# Patient Record
Sex: FEMALE | Race: WHITE
[De-identification: names, ages, dates, MRNs, and addresses within clinical notes are randomized per-mention and may not be internally consistent; named-entity substitution may affect disease eponyms.]

---

## 2019-08-31 ENCOUNTER — HOSPITAL ENCOUNTER (INPATIENT)
Dept: HOSPITAL 47 - FBPOP | Age: 24
LOS: 3 days | Discharge: HOME | End: 2019-09-03
Attending: OBSTETRICS & GYNECOLOGY | Admitting: OBSTETRICS & GYNECOLOGY
Payer: COMMERCIAL

## 2019-08-31 VITALS — BODY MASS INDEX: 51.3 KG/M2

## 2019-08-31 DIAGNOSIS — Z79.899: ICD-10-CM

## 2019-08-31 DIAGNOSIS — Z3A.39: ICD-10-CM

## 2019-08-31 DIAGNOSIS — Z88.6: ICD-10-CM

## 2019-08-31 DIAGNOSIS — Z83.49: ICD-10-CM

## 2019-08-31 DIAGNOSIS — E66.01: ICD-10-CM

## 2019-08-31 DIAGNOSIS — Z83.3: ICD-10-CM

## 2019-08-31 DIAGNOSIS — Z90.49: ICD-10-CM

## 2019-08-31 DIAGNOSIS — G43.909: ICD-10-CM

## 2019-08-31 DIAGNOSIS — Z82.49: ICD-10-CM

## 2019-08-31 LAB
BASOPHILS # BLD AUTO: 0 K/UL (ref 0–0.2)
BASOPHILS NFR BLD AUTO: 0 %
EOSINOPHIL # BLD AUTO: 0.1 K/UL (ref 0–0.7)
EOSINOPHIL NFR BLD AUTO: 1 %
ERYTHROCYTE [DISTWIDTH] IN BLOOD BY AUTOMATED COUNT: 4.17 M/UL (ref 3.8–5.4)
ERYTHROCYTE [DISTWIDTH] IN BLOOD: 15.6 % (ref 11.5–15.5)
GLUCOSE BLD-MCNC: 91 MG/DL (ref 75–99)
HCT VFR BLD AUTO: 36.4 % (ref 34–46)
HGB BLD-MCNC: 12.5 GM/DL (ref 11.4–16)
LYMPHOCYTES # SPEC AUTO: 1.8 K/UL (ref 1–4.8)
LYMPHOCYTES NFR SPEC AUTO: 19 %
MCH RBC QN AUTO: 29.9 PG (ref 25–35)
MCHC RBC AUTO-ENTMCNC: 34.3 G/DL (ref 31–37)
MCV RBC AUTO: 87.1 FL (ref 80–100)
MONOCYTES # BLD AUTO: 0.7 K/UL (ref 0–1)
MONOCYTES NFR BLD AUTO: 8 %
NEUTROPHILS # BLD AUTO: 6.6 K/UL (ref 1.3–7.7)
NEUTROPHILS NFR BLD AUTO: 70 %
PLATELET # BLD AUTO: 280 K/UL (ref 150–450)
WBC # BLD AUTO: 9.5 K/UL (ref 3.8–10.6)

## 2019-08-31 PROCEDURE — 86850 RBC ANTIBODY SCREEN: CPT

## 2019-08-31 PROCEDURE — 86901 BLOOD TYPING SEROLOGIC RH(D): CPT

## 2019-08-31 PROCEDURE — 85025 COMPLETE CBC W/AUTO DIFF WBC: CPT

## 2019-08-31 PROCEDURE — 86900 BLOOD TYPING SEROLOGIC ABO: CPT

## 2019-08-31 PROCEDURE — 59025 FETAL NON-STRESS TEST: CPT

## 2019-08-31 PROCEDURE — 99213 OFFICE O/P EST LOW 20 MIN: CPT

## 2019-08-31 PROCEDURE — 83036 HEMOGLOBIN GLYCOSYLATED A1C: CPT

## 2019-09-01 PROCEDURE — 3E0R3BZ INTRODUCTION OF ANESTHETIC AGENT INTO SPINAL CANAL, PERCUTANEOUS APPROACH: ICD-10-PCS

## 2019-09-01 PROCEDURE — 00HU33Z INSERTION OF INFUSION DEVICE INTO SPINAL CANAL, PERCUTANEOUS APPROACH: ICD-10-PCS

## 2019-09-01 RX ADMIN — POTASSIUM CHLORIDE SCH MLS/HR: 14.9 INJECTION, SOLUTION INTRAVENOUS at 08:17

## 2019-09-01 RX ADMIN — IBUPROFEN SCH MLS/HR: 800 INJECTION INTRAVENOUS at 22:56

## 2019-09-01 RX ADMIN — BUTORPHANOL TARTRATE PRN MG: 1 INJECTION, SOLUTION INTRAMUSCULAR; INTRAVENOUS at 06:34

## 2019-09-01 RX ADMIN — IBUPROFEN SCH MLS/HR: 800 INJECTION INTRAVENOUS at 16:28

## 2019-09-01 RX ADMIN — POTASSIUM CHLORIDE SCH MLS/HR: 14.9 INJECTION, SOLUTION INTRAVENOUS at 03:47

## 2019-09-01 RX ADMIN — POTASSIUM CHLORIDE SCH MLS/HR: 14.9 INJECTION, SOLUTION INTRAVENOUS at 00:43

## 2019-09-01 RX ADMIN — POTASSIUM CHLORIDE SCH MLS/HR: 14.9 INJECTION, SOLUTION INTRAVENOUS at 12:00

## 2019-09-01 RX ADMIN — BUTORPHANOL TARTRATE PRN MG: 1 INJECTION, SOLUTION INTRAMUSCULAR; INTRAVENOUS at 04:23

## 2019-09-01 NOTE — P.HPOB
History of Present Illness


H&P Date: 19


Chief Complaint: IUP @ 39 1/7 weeks, PROM





This is a 24-year-old  010 at 39 and one sevenths weeks with an estimated 

due date of 2018 based on last menstrual period and consistent with 20 week 

ultrasound.  Patient is a known patient of Dr. Horton.  Patient has been re

ceiving routine prenatal care since the first trimester.


She doesn't a history of migraine headaches for which she sees Dr. Garcia.  In 

addition she has been diagnosed with gestational diabetes which has been diet 

controlled.  Estimated fetal weight on Tuesday of last week was 91st percentile 

8 lbs. 4 oz.  She has had recently good control throughout the pregnancy of her 

blood sugars.  


Patient states her water broke about 8 PM last evening, clear in nature.  

Patient noted good fetal movement she denied contractions at that time no 

vaginal bleeding.





On prenatal blood work she has a blood type of O+, rubella immune, RPR 

nonreactive, B surface antigen negative, HIV negative.  She did pass her early 1

hour Glucola.  She failed her second trimester gestational diabetes screen at 

the level of 183 and subsequently failed her 3 hour.  She did receive her T 

Vaccine on 19.  Group beta strep negative on 19.





Review of Systems


Constitutional: Denies chills, Denies fatigue, Denies fever


Ears, nose, mouth and throat: Denies headache


Cardiovascular: Reports leg edema


Respiratory: Denies cough, Denies dyspnea


Gastrointestinal: Denies constipation, Denies diarrhea, Denies nausea, Denies 

vomiting


Genitourinary: Reports pregnant





Past Medical History


Past Medical History: No Reported History


Additional Past Medical History / Comment(s): gestational diabetes 2019, 

migraine HA


History of Any Multi-Drug Resistant Organisms: None Reported


Past Surgical History: Cholecystectomy


Past Anesthesia/Blood Transfusion Reactions: No Reported Reaction


Past Psychological History: No Psychological Hx Reported


Smoking Status: Never smoker


Past Alcohol Use History: None Reported


Past Drug Use History: None Reported





- Past Family History


  ** Mother


Family Medical History: Diabetes Mellitus





  ** Father


Family Medical History: Hyperlipidemia, Hypertension





Medications and Allergies


                                Home Medications











 Medication  Instructions  Recorded  Confirmed  Type


 


Pnv No.95/Ferrous Fum/Folic AC 1 tab PO DAILY 19 History





[Prenatal Multivitamin Tablet]    








                                    Allergies











Allergy/AdvReac Type Severity Reaction Status Date / Time


 


acetaminophen [From Tylenol] AdvReac  Unknown Verified 19 20:54














Exam


Osteopathic Statement: *.  No significant issues noted on an osteopathic 

structural exam other than those noted in the History and Physical/Consult.


                                   Vital Signs











  Temp Pulse Resp BP Pulse Ox


 


 19 21:14  97.3 F L  108 H  16  112/67  97


 


 19 20:55  97.5 F L  111 H  16  112/67  97








                                Intake and Output











 19





 22:59 06:59 14:59


 


Other:   


 


  # Voids 2 2 


 


  Weight 131.587 kg  














Targeted physical exam is performed on this date in general this is a well-

nourished well-developed obese female in no acute distress, her breathing is 

noted to nonlabored her heart has a regular rate and rhythm her abdomen is obese

and gravid, there is +1 lower extremity edema noted, fetal heart tones are noted

to be category 1 she is sugey irregularly contractions not graphing well 

given maternal size.  On cervical exam she is 4/100/-2. 





Results


Result Diagrams: 


                                 19 21:23





                  Abnormal Lab Results - Last 24 Hours (Table)











  19 Range/Units





  21:23 


 


RDW  15.6 H  (11.5-15.5)  %














Assessment and Plan


(1) Term pregnancy


Current Visit: Yes   Status: Acute   Code(s): Z34.90 - ENCNTR FOR SUPRVSN OF 

NORMAL PREGNANCY, UNSP, UNSP TRIMESTER   SNOMED Code(s): 86256379


   





(2) GDM (gestational diabetes mellitus), class A1


Current Visit: Yes   Status: Acute   Code(s): O24.410 - GESTATIONAL DIABETES 

MELLITUS IN PREGNANCY, DIET CONTROLLED   SNOMED Code(s): 87864158


   





(3) Obesity


Current Visit: Yes   Status: Acute   Code(s): E66.9 - OBESITY, UNSPECIFIED   

SNOMED Code(s): 803144937


   





(4) PROM (premature rupture of membranes)


Current Visit: Yes   Status: Acute   Code(s): O42.90 - GUNNER ROM, 7TH0 BETW RUPT 

& ONST LABR, UNSP WEEKS OF GEST   SNOMED Code(s): 08458065


   


Plan: 





Patient is admitted to labor and delivery for expectant management.  Pitocin 

augmentation if she does not go into labor on her own was discussed.  Epidu

ral/Stadol are offered for analgesia throughout labor.

## 2019-09-02 LAB
BASOPHILS # BLD AUTO: 0 K/UL (ref 0–0.2)
BASOPHILS NFR BLD AUTO: 0 %
EOSINOPHIL # BLD AUTO: 0 K/UL (ref 0–0.7)
EOSINOPHIL NFR BLD AUTO: 0 %
ERYTHROCYTE [DISTWIDTH] IN BLOOD BY AUTOMATED COUNT: 3.37 M/UL (ref 3.8–5.4)
ERYTHROCYTE [DISTWIDTH] IN BLOOD: 14.6 % (ref 11.5–15.5)
HCT VFR BLD AUTO: 29.9 % (ref 34–46)
HGB BLD-MCNC: 10 GM/DL (ref 11.4–16)
LYMPHOCYTES # SPEC AUTO: 2 K/UL (ref 1–4.8)
LYMPHOCYTES NFR SPEC AUTO: 17 %
MCH RBC QN AUTO: 29.6 PG (ref 25–35)
MCHC RBC AUTO-ENTMCNC: 33.5 G/DL (ref 31–37)
MCV RBC AUTO: 88.5 FL (ref 80–100)
MONOCYTES # BLD AUTO: 0.8 K/UL (ref 0–1)
MONOCYTES NFR BLD AUTO: 6 %
NEUTROPHILS # BLD AUTO: 9 K/UL (ref 1.3–7.7)
NEUTROPHILS NFR BLD AUTO: 75 %
PLATELET # BLD AUTO: 205 K/UL (ref 150–450)
WBC # BLD AUTO: 12 K/UL (ref 3.8–10.6)

## 2019-09-02 RX ADMIN — IBUPROFEN PRN MG: 600 TABLET ORAL at 16:22

## 2019-09-02 RX ADMIN — POTASSIUM CHLORIDE SCH MLS/HR: 14.9 INJECTION, SOLUTION INTRAVENOUS at 04:34

## 2019-09-02 RX ADMIN — DOCUSATE SODIUM AND SENNOSIDES SCH EACH: 50; 8.6 TABLET ORAL at 19:46

## 2019-09-02 RX ADMIN — IBUPROFEN SCH MLS/HR: 800 INJECTION INTRAVENOUS at 10:23

## 2019-09-02 RX ADMIN — IBUPROFEN PRN MG: 600 TABLET ORAL at 23:18

## 2019-09-02 RX ADMIN — IBUPROFEN SCH MLS/HR: 800 INJECTION INTRAVENOUS at 04:33

## 2019-09-02 RX ADMIN — HYDROCODONE BITARTRATE AND ACETAMINOPHEN PRN EACH: 5; 325 TABLET ORAL at 19:45

## 2019-09-02 NOTE — P.PNOBGPC
Subjective





- Subjective


Principal diagnosis: POD 1 LTCS arrest of first stage of labor


Interval history: 





Patient has done well overnight.  She is ambulating and voiding without 

difficulty.  She is using ibuprofen for discomfort.  She does has a sensitivity 

to Tylenol.  Patient is breast-feeding without difficulty.  She is tolerating a 

regular diet without nausea or vomiting.  She states her lochia is minimal.


Patient reports: Reports appetite normal, Reports voiding normally, Reports pain

well controlled, Reports ambulating normally


: doing well, nursing well





Objective





- Vital Signs


Latest vital signs: 


                                   Vital Signs











  Temp Pulse Resp BP Pulse Ox


 


 19 12:00  98.1 F  92  16  99/55  96


 


 19 07:34  98.4 F  92  16  114/46  97


 


 19 03:59  98.3 F  92  16  116/59  99


 


 19 23:33  98.5 F  74  16  116/56 


 


 19 20:00  98.2 F  68  16  99/59 


 


 19 17:04  97.7 F  87  16  111/69  96


 


 19 16:34   122 H  16  114/58  95


 


 19 16:04   83  16  107/56  96


 


 19 15:49   90  16  106/56  95


 


 19 15:34   89  16  112/53  96


 


 19 15:19   112 H  16  121/60  96


 


 19 15:04  98.5 F  100  16  129/64  96








                                Intake and Output











 19





 22:59 06:59 14:59


 


Intake Total  550 


 


Output Total 1125 1000 900


 


Balance -1125 -450 -900


 


Intake:   


 


  Oral  550 


 


Output:   


 


  Urine 1125 1000 900


 


    Uretheral (Riddle) 275  


 


Other:   


 


  # Voids   1














- Exam


Extremities: Present: edema


Abdomen: Present: normal appearance, soft


Incision: Present: normal, dry, intact


Uterus: Present: normal, firm





- Labs


Labs: 


                  Abnormal Lab Results - Last 24 Hours (Table)











  19 Range/Units





  06:24 


 


WBC  12.0 H  (3.8-10.6)  k/uL


 


RBC  3.37 L  (3.80-5.40)  m/uL


 


Hgb  10.0 L D  (11.4-16.0)  gm/dL


 


Hct  29.9 L  (34.0-46.0)  %


 


Neutrophils #  9.0 H  (1.3-7.7)  k/uL














Assessment and Plan


(1) Term pregnancy


Current Visit: Yes   Status: Acute   Code(s): Z34.90 - ENCNTR FOR SUPRVSN OF 

NORMAL PREGNANCY, UNSP, UNSP TRIMESTER   SNOMED Code(s): 23220637


   





(2) GDM (gestational diabetes mellitus), class A1


Current Visit: Yes   Status: Acute   Code(s): O24.410 - GESTATIONAL DIABETES 

MELLITUS IN PREGNANCY, DIET CONTROLLED   SNOMED Code(s): 54318588


   





(3) Obesity


Current Visit: Yes   Status: Acute   Code(s): E66.9 - OBESITY, UNSPECIFIED   

SNOMED Code(s): 273032673


   





(4) PROM (premature rupture of membranes)


Current Visit: Yes   Status: Acute   Code(s): O42.90 - GUNNER ROM, 7TH0 BETW RUPT 

& ONST LABR, UNSP WEEKS OF GEST   SNOMED Code(s): 96370610


   





(5) S/P  section


Current Visit: Yes   Status: Acute   Code(s): Z98.891 - HISTORY OF UTERINE SCAR 

FROM PREVIOUS SURGERY   SNOMED Code(s): 073489952


   


Plan: 





Patient is doing well postoperatively, will plan to continue routine 

postoperative care.  Anticipate discharge home tomorrow.

## 2019-09-03 VITALS — HEART RATE: 79 BPM | TEMPERATURE: 98.9 F | SYSTOLIC BLOOD PRESSURE: 108 MMHG | DIASTOLIC BLOOD PRESSURE: 67 MMHG

## 2019-09-03 VITALS — RESPIRATION RATE: 16 BRPM

## 2019-09-03 LAB — HBA1C MFR BLD: 5.6 % (ref 4–6)

## 2019-09-03 RX ADMIN — HYDROCODONE BITARTRATE AND ACETAMINOPHEN PRN EACH: 5; 325 TABLET ORAL at 09:15

## 2019-09-03 RX ADMIN — IBUPROFEN PRN MG: 600 TABLET ORAL at 06:21

## 2019-09-03 RX ADMIN — DOCUSATE SODIUM AND SENNOSIDES SCH EACH: 50; 8.6 TABLET ORAL at 09:15

## 2019-09-03 RX ADMIN — IBUPROFEN PRN MG: 600 TABLET ORAL at 13:36

## 2019-09-03 RX ADMIN — HYDROCODONE BITARTRATE AND ACETAMINOPHEN PRN EACH: 5; 325 TABLET ORAL at 02:56

## 2019-09-03 NOTE — P.DS
Providers


Date of admission: 


19 21:06





Expected date of discharge: 19


Attending physician: 


Dalila Horton





Primary care physician: 


Stated None





Hospital Course: 





This is a 24-year-old  2 para 0010 EDC 2019 at 39 and one sevenths 

weeks' gestation.  Patient presented with spontaneous amniorrhexis which 

occurred at home, clear fluid.  Oxytocin augmentation was started, and she 

progressed through labor to 4 cm dilatation.  No further dilatation or descent 

was noted, and the decision was made to proceed with primary low transverse 

 section.  Patient's pregnancy is remarkable for blood type O positive, 

rubella status immune, group B strep cultures negative.  She has gestational 

diabetes that has been diet controlled.  She also has morbid obesity.  Please 

see dictated history and physical for details.





She underwent a low-transverse  section and gave birth to a liveborn 

male with Apgar scores of 8 and 9 at one and 5 minutes respectively.  He weighed

3810 g or 8 lbs. 6 oz.  Surgery was basically unremarkable, please see dictated 

operative note for details.





This morning the patient is doing well.  She is voiding, ambulating, passing 

flatus without difficulty.  Vital signs are stable and she is afebrile.  Fundus 

is firm and in the midline, symmetric, 18 week size, nontender.  Lochia rubra is

minimal to moderate.  Extremities reveal trace edema.  She has remained afebrile

and feels as if she is ready to be discharged home.  She is in good condition 

for discharge home.  Circumcision has been performed at this time.





Patient is reminded no intercourse, tampons or douching.  She will use ove

r-the-counter Advil, Motrin, or Aleve as needed for pain.  We will check a 2 

hour GTT at the 6 week visit.  She will follow-up with me in 2 weeks for 

incision check.  I reviewed with her proper incisional care.  I also reminded 

her that this would be an excellent time for weight loss, as it will keep her 

healthy are as life progresses.  [place] circumcision is being performed at this

time.





Call with any fevers shakes or chills, foul smelling or copious lochia, with the

passage of large blood clots, with any pain not alleviated by over-the-counter 

products, or indeed with any concerns.  I reminded her to consider her options 

for contraception and we will discuss this further in the office.








Patient Condition at Discharge: Good





Plan - Discharge Summary


Discharge Rx Participant: No


New Discharge Prescriptions: 


No Action


   Pnv No.95/Ferrous Fum/Folic AC [Prenatal Multivitamin Tablet] 1 tab PO DAILY


Discharge Medication List





Pnv No.95/Ferrous Fum/Folic AC [Prenatal Multivitamin Tablet] 1 tab PO DAILY 

19 [History]








Follow up Appointment(s)/Referral(s): 


Dalila Horton MD [STAFF PHYSICIAN] - 2 Weeks


Discharge Disposition: HOME SELF-CARE

## 2023-09-08 ENCOUNTER — HOSPITAL ENCOUNTER (OUTPATIENT)
Dept: HOSPITAL 47 - FBPOP | Age: 28
Discharge: HOME | End: 2023-09-08
Attending: OBSTETRICS & GYNECOLOGY
Payer: COMMERCIAL

## 2023-09-08 VITALS
TEMPERATURE: 97.4 F | DIASTOLIC BLOOD PRESSURE: 78 MMHG | RESPIRATION RATE: 16 BRPM | SYSTOLIC BLOOD PRESSURE: 128 MMHG | HEART RATE: 103 BPM

## 2023-09-08 DIAGNOSIS — Z79.84: ICD-10-CM

## 2023-09-08 DIAGNOSIS — R10.9: ICD-10-CM

## 2023-09-08 DIAGNOSIS — O24.113: ICD-10-CM

## 2023-09-08 DIAGNOSIS — Z3A.34: ICD-10-CM

## 2023-09-08 DIAGNOSIS — Z88.6: ICD-10-CM

## 2023-09-08 DIAGNOSIS — O26.893: Primary | ICD-10-CM

## 2023-09-08 DIAGNOSIS — E11.9: ICD-10-CM

## 2023-09-08 PROCEDURE — 96360 HYDRATION IV INFUSION INIT: CPT

## 2023-09-08 PROCEDURE — 59025 FETAL NON-STRESS TEST: CPT

## 2023-09-08 PROCEDURE — 99214 OFFICE O/P EST MOD 30 MIN: CPT

## 2023-09-08 PROCEDURE — 96361 HYDRATE IV INFUSION ADD-ON: CPT

## 2023-09-13 NOTE — P.MSEPDOC
Presenting Problems





- Arrival Data


Date of Arrival on Unit: 23


Time of Arrival on Unit: 15:11


Mode of Transport: Ambulatory





- Complaint


OB-Reason for Admission/Chief Complaint: Pain


Comment: Pt c/o abdominal cramping and sharp left sided abdominal pain rating 

1/10 constantly and 4/10 when moving.





Prenatal Medical History





- Pregnancy Information


: 3


Para: 1


Term: 1


: 0


Abortions: Spontaneous or Elective: 1


Number of Living Children: 1





- Gestational Age


Gestational Age by RUMA (wks/days): 34 Weeks and 6 Days





- Prenatal History


Pregnancy Complications: Other


Comment: Type II diabetes





Review of Systems





- Review of Systems


Constitutional: No problems


Breast: No problems


ENT: No problems


Cardiovascular: No problems


Respiratory: No problems


Gastrointestinal: No problems


Genitourinary: No problems


Musculoskeletal: No problems


Neurological: No problems


Skin: No problems





Vital Signs





- Temperature


Temperature: 97.4 F


Temperature Source: Temporal Artery Scan





- Pulse


  ** Pulse Oximetery


Pulse Rate: 103


Pulse Assessment Method: Pulse Oximetry





- Respirations


Respiratory Rate: 16


Oxygen Delivery Method: Room Air


O2 Sat by Pulse Oximetry: 98





- Blood Pressure


  ** Right Arm


Blood Pressure: 128/78


Blood Pressure Mean: 94


Blood Pressure Source: Automatic Cuff





Medical Screen Scoring





- Fetal Assessment - Baby A


Baseline FHR: 130


Fetal Heart Rate - NICHD Category: Category I (Normal)


NST: Reactive





Physician Notification





- Physician Notified


Physician Notified Date: 23


Physician Notified Time: 15:55


Physician: Francie Spencer Order Received: Yes





- Notification Comment


Comment: Spoke with Dr. Spencer. Pt is  34 weeks and 6 days. Has had 

diarrhea.  Spoke with Dr. Spencer. Pt is  34 weeks and 6 days. Has had 

diarrhea for two days, abdominal cramping since 0330 this AM, and sharp pain on 

left side of.  abdomen. says pain and cramping is constantly 1/10 but rates 4/10

when moving. Vitals.  WNL, no fever. Abdomen is soft. Pt states she does not 

feel like the pain is cx pain.  Reactive NST. Orders recieved for one time dose 

of imodium and IV fluid bolus. If.  interventions help, pt can be d/c





Maternal Fetal Triage Index





- Maternal Fetal Triage Index


Presenting for scheduled procedure w/no complaint: No





- Stat/Priority 1


Stat Priority 1: No





- Urgent/Priority 2


Urgent Priority 2: No





- Prompt/Priority 3


Prompt Priority 3: No





- Non-Urgent/Priority 4


Non-Urgent Priority 4: Yes


Criteria Met for Priority 4: Pt has been having abdominal cramping and left 

sided sharp pain since this morning 0330. States she has had diarrhea for the 

past two days. Pt c/o cramping during the whole pregnancy but states the 

cramping has never been this bad and woke her up in the middle of the night. 

rates pain 4/10 when moving, 1/10 constantly otherwise





Disposition





- Disposition


OB Disposition: Triage, Discharge to home, Written follow up instructions 

reviewed


Discharge Date: 23


Discharge Time: 17:25


I agree with the RN Medical Screening Exam: Yes


Physician's MSE Comment: 


I have neither seen nor examined the patient


Case reviewed; plan agreed upon as documented in EMR&OBIX.: Yes


Diagnosis: PREGNANCY RELATED CONDITIONS, UNSPECIFIED, THIRD TRIMESTER

## 2023-09-26 ENCOUNTER — HOSPITAL ENCOUNTER (OUTPATIENT)
Dept: HOSPITAL 47 - FBPOP | Age: 28
Discharge: HOME | End: 2023-09-26
Attending: OBSTETRICS & GYNECOLOGY
Payer: COMMERCIAL

## 2023-09-26 VITALS
HEART RATE: 75 BPM | SYSTOLIC BLOOD PRESSURE: 137 MMHG | TEMPERATURE: 97.2 F | DIASTOLIC BLOOD PRESSURE: 72 MMHG | RESPIRATION RATE: 18 BRPM

## 2023-09-26 DIAGNOSIS — Z79.84: ICD-10-CM

## 2023-09-26 DIAGNOSIS — Z88.8: ICD-10-CM

## 2023-09-26 DIAGNOSIS — Z3A.37: ICD-10-CM

## 2023-09-26 DIAGNOSIS — O24.415: Primary | ICD-10-CM

## 2023-09-26 LAB
ALT SERPL-CCNC: 16 U/L (ref 4–34)
AST SERPL-CCNC: 21 U/L (ref 14–36)
BASOPHILS # BLD AUTO: 0 K/UL (ref 0–0.2)
BASOPHILS NFR BLD AUTO: 0 %
EOSINOPHIL # BLD AUTO: 0 K/UL (ref 0–0.7)
EOSINOPHIL NFR BLD AUTO: 0 %
ERYTHROCYTE [DISTWIDTH] IN BLOOD BY AUTOMATED COUNT: 3.88 M/UL (ref 3.8–5.4)
ERYTHROCYTE [DISTWIDTH] IN BLOOD: 13 % (ref 11.5–15.5)
HCT VFR BLD AUTO: 34.6 % (ref 34–46)
HGB BLD-MCNC: 11.5 GM/DL (ref 11.4–16)
LYMPHOCYTES # SPEC AUTO: 1.8 K/UL (ref 1–4.8)
LYMPHOCYTES NFR SPEC AUTO: 30 %
MCH RBC QN AUTO: 29.7 PG (ref 25–35)
MCHC RBC AUTO-ENTMCNC: 33.4 G/DL (ref 31–37)
MCV RBC AUTO: 89 FL (ref 80–100)
MONOCYTES # BLD AUTO: 0.4 K/UL (ref 0–1)
MONOCYTES NFR BLD AUTO: 7 %
NEUTROPHILS # BLD AUTO: 3.7 K/UL (ref 1.3–7.7)
NEUTROPHILS NFR BLD AUTO: 61 %
PH UR: 7.5 [PH] (ref 5–8)
PLATELET # BLD AUTO: 204 K/UL (ref 150–450)
PROT/CREAT UR-RTO: 0.06
SP GR UR: 1.01 (ref 1–1.03)
SQUAMOUS UR QL AUTO: 2 /HPF (ref 0–4)
URATE SERPL-MCNC: 7 MG/DL (ref 3.7–7.4)
UROBILINOGEN UR QL STRIP: <2 MG/DL (ref ?–2)
WBC # BLD AUTO: 6 K/UL (ref 3.8–10.6)
WBC #/AREA URNS HPF: 6 /HPF (ref 0–5)

## 2023-09-26 PROCEDURE — 81001 URINALYSIS AUTO W/SCOPE: CPT

## 2023-09-26 PROCEDURE — 84450 TRANSFERASE (AST) (SGOT): CPT

## 2023-09-26 PROCEDURE — 59025 FETAL NON-STRESS TEST: CPT

## 2023-09-26 PROCEDURE — 84156 ASSAY OF PROTEIN URINE: CPT

## 2023-09-26 PROCEDURE — 85025 COMPLETE CBC W/AUTO DIFF WBC: CPT

## 2023-09-26 PROCEDURE — 84550 ASSAY OF BLOOD/URIC ACID: CPT

## 2023-09-26 PROCEDURE — 84460 ALANINE AMINO (ALT) (SGPT): CPT

## 2023-09-26 PROCEDURE — 82570 ASSAY OF URINE CREATININE: CPT

## 2023-10-02 ENCOUNTER — HOSPITAL ENCOUNTER (INPATIENT)
Dept: HOSPITAL 47 - 4FBP | Age: 28
LOS: 2 days | Discharge: HOME | End: 2023-10-04
Attending: OBSTETRICS & GYNECOLOGY | Admitting: OBSTETRICS & GYNECOLOGY
Payer: COMMERCIAL

## 2023-10-02 VITALS — BODY MASS INDEX: 52.9 KG/M2

## 2023-10-02 DIAGNOSIS — Z28.311: ICD-10-CM

## 2023-10-02 DIAGNOSIS — Z28.21: ICD-10-CM

## 2023-10-02 DIAGNOSIS — Z3A.38: ICD-10-CM

## 2023-10-02 DIAGNOSIS — E11.65: ICD-10-CM

## 2023-10-02 DIAGNOSIS — Z83.3: ICD-10-CM

## 2023-10-02 DIAGNOSIS — Z86.32: ICD-10-CM

## 2023-10-02 DIAGNOSIS — Z30.2: ICD-10-CM

## 2023-10-02 DIAGNOSIS — K21.9: ICD-10-CM

## 2023-10-02 DIAGNOSIS — O34.211: ICD-10-CM

## 2023-10-02 DIAGNOSIS — Z79.82: ICD-10-CM

## 2023-10-02 DIAGNOSIS — Z79.84: ICD-10-CM

## 2023-10-02 DIAGNOSIS — G43.909: ICD-10-CM

## 2023-10-02 LAB
BASOPHILS # BLD AUTO: 0 K/UL (ref 0–0.2)
BASOPHILS NFR BLD AUTO: 0 %
EOSINOPHIL # BLD AUTO: 0 K/UL (ref 0–0.7)
EOSINOPHIL NFR BLD AUTO: 1 %
ERYTHROCYTE [DISTWIDTH] IN BLOOD BY AUTOMATED COUNT: 3.8 M/UL (ref 3.8–5.4)
ERYTHROCYTE [DISTWIDTH] IN BLOOD: 13.9 % (ref 11.5–15.5)
GLUCOSE BLD-MCNC: 79 MG/DL (ref 70–110)
HCT VFR BLD AUTO: 33.7 % (ref 34–46)
HGB BLD-MCNC: 11.3 GM/DL (ref 11.4–16)
LYMPHOCYTES # SPEC AUTO: 1.6 K/UL (ref 1–4.8)
LYMPHOCYTES NFR SPEC AUTO: 26 %
MCH RBC QN AUTO: 29.8 PG (ref 25–35)
MCHC RBC AUTO-ENTMCNC: 33.6 G/DL (ref 31–37)
MCV RBC AUTO: 88.7 FL (ref 80–100)
MONOCYTES # BLD AUTO: 0.4 K/UL (ref 0–1)
MONOCYTES NFR BLD AUTO: 6 %
NEUTROPHILS # BLD AUTO: 4.3 K/UL (ref 1.3–7.7)
NEUTROPHILS NFR BLD AUTO: 66 %
PLATELET # BLD AUTO: 202 K/UL (ref 150–450)
WBC # BLD AUTO: 6.4 K/UL (ref 3.8–10.6)

## 2023-10-02 PROCEDURE — 86901 BLOOD TYPING SEROLOGIC RH(D): CPT

## 2023-10-02 PROCEDURE — 85025 COMPLETE CBC W/AUTO DIFF WBC: CPT

## 2023-10-02 PROCEDURE — 86900 BLOOD TYPING SEROLOGIC ABO: CPT

## 2023-10-02 PROCEDURE — 83036 HEMOGLOBIN GLYCOSYLATED A1C: CPT

## 2023-10-02 PROCEDURE — 88302 TISSUE EXAM BY PATHOLOGIST: CPT

## 2023-10-02 PROCEDURE — 0UB70ZZ EXCISION OF BILATERAL FALLOPIAN TUBES, OPEN APPROACH: ICD-10-PCS

## 2023-10-02 PROCEDURE — 86850 RBC ANTIBODY SCREEN: CPT

## 2023-10-02 PROCEDURE — 88307 TISSUE EXAM BY PATHOLOGIST: CPT

## 2023-10-02 RX ADMIN — POTASSIUM CHLORIDE SCH MLS/HR: 14.9 INJECTION, SOLUTION INTRAVENOUS at 17:56

## 2023-10-02 RX ADMIN — IBUPROFEN SCH: 600 TABLET ORAL at 20:40

## 2023-10-02 RX ADMIN — DIPHENHYDRAMINE HYDROCHLORIDE PRN MG: 50 INJECTION, SOLUTION INTRAMUSCULAR; INTRAVENOUS at 17:07

## 2023-10-02 RX ADMIN — DOCUSATE SODIUM AND SENNOSIDES SCH EACH: 50; 8.6 TABLET ORAL at 20:48

## 2023-10-02 RX ADMIN — IBUPROFEN PRN MLS/HR: 800 INJECTION INTRAVENOUS at 17:00

## 2023-10-02 NOTE — P.HPOB
History of Present Illness


H&P Date: 10/02/23


Chief Complaint: IUP at 38-2/7 weeks, gestational diabetes, morbid obesity, h/o 

c/s x 1





28-year-old  011 at 38-2/7 weeks that presents to labor and delivery for 

scheduled repeat  section with bilateral salpingectomy.  Patient has 

been receiving routine prenatal care which has been complicated 


by diagnosis of pre-existing diabetes.  Patient has on 2000 mg of metformin 

daily.  Patient did see maternal fetal medicine given her elevated fasting 

glucose levels they recommended oral Glucophage.  She was taking previously 

prior to pregnancy and they increased the dose to 2000 mg.


Patient has underwent  testing which has been normal, on prenatal 

bloodwork this patient is a blood type of O+, rubella status immune, hepatitis B

surface antigen negative, HIV negative, RPR is nonreactive, group beta strep 

cultures are negative.





Today patient notes good fetal movement denies contractions vaginal bleeding or 

loss of fluid





Review of Systems


Constitutional: Denies chills, Denies fatigue, Denies fever


Ears, nose, mouth and throat: Denies headache


Cardiovascular: Reports leg edema


Respiratory: Denies dyspnea


Gastrointestinal: Denies constipation, Denies diarrhea, Denies nausea, Denies 

vomiting


Genitourinary: Reports pregnant





Past Medical History


Past Medical History: Diabetes Mellitus, GERD/Reflux


Additional Past Medical History / Comment(s): Type 2 Diabetes, hx Gestational 

Diabetes, Migraines.


History of Any Multi-Drug Resistant Organisms: None Reported


Past Surgical History:  Section, Cholecystectomy


Past Anesthesia/Blood Transfusion Reactions: No Reported Reaction, Motion 

Sickness


Past Psychological History: No Psychological Hx Reported


Smoking Status: Never smoker


Past Alcohol Use History: None Reported


Past Drug Use History: None Reported





- Past Family History


  ** Mother


Family Medical History: Diabetes Mellitus





  ** Father


Family Medical History: Hyperlipidemia, Hypertension





Medications and Allergies


                                Home Medications











 Medication  Instructions  Recorded  Confirmed  Type


 


Pnv No.95/Ferrous Fum/Folic AC 1 tab PO DAILY 07/02/19 10/02/23 History





[Prenatal Multivitamin Tablet]    


 


Aspirin [Adult Low Dose Aspirin EC] 81 mg PO DAILY 09/08/23 10/02/23 History


 


metFORMIN HCL 1,000 mg PO BID 09/08/23 10/02/23 History








                                    Allergies











Allergy/AdvReac Type Severity Reaction Status Date / Time


 


acetaminophen [From Tylenol] AdvReac  Migraines Verified 10/02/23 10:33














Exam


Osteopathic Statement: *.  No significant issues noted on an osteopathic 

structural exam other than those noted in the History and Physical/Consult.


                                   Vital Signs











  Temp Pulse Resp BP Pulse Ox


 


 10/02/23 10:34  98.4 F  75  16  133/76  98








                                Intake and Output











 10/01/23 10/02/23 10/02/23





 22:59 06:59 14:59


 


Other:   


 


  Weight   135.624 kg














Targeted physical exam is performed in [facility] a well-nourished well-

developed pregnant female in no acute distress, breathing is nonlabored, heart 

has regular rhythm, abdomen is obese and gravid, fetal heart tones are be 

category 1 and she is sugey irregularly.  Cervical exam is deferred.





Results


Result Diagrams: 


                                 10/02/23 10:59





                  Abnormal Lab Results - Last 24 Hours (Table)











  10/02/23 Range/Units





  10:59 


 


Hgb  11.3 L  (11.4-16.0)  gm/dL


 


Hct  33.7 L  (34.0-46.0)  %














Assessment and Plan


(1) Diabetes in pregnancy


Current Visit: Yes   Status: Acute   Code(s): O24.919 - UNSP DIABETES MELLITUS 

IN PREGNANCY, UNSPECIFIED TRIMESTER   SNOMED Code(s): 720288971


   





(2) Term pregnancy


Current Visit: No   Status: Acute   Code(s): Z34.90 - ENCNTR FOR SUPRVSN OF 

NORMAL PREGNANCY, UNSP, UNSP TRIMESTER   SNOMED Code(s): 88456411


   


Plan: 





28-year-old  at 38-2/7 weeks presents for repeat  section with 

bilateral salpingectomy.  Patient has been receiving routine prenatal care 

complicated by uncontrolled diabetes.  Patient was seen by maternal fetal 

medicine who recommended continued metformin and treatment.  Metformin 2000 mg 

daily with moderate control of blood sugars.  Patient states she is done with 

childbearing and elects bilateral salpingectomy.  Patient is counseled on repeat

 section with bilateral salpingectomy.  All questions are answered and 

patient states understanding.  We'll proceed with repeat  section with 

bilateral salpingectomy.

## 2023-10-02 NOTE — P.OP
Date of Procedure: 10/02/23


Preoperative Diagnosis: 


IUP at 38-3/7 weeks, diabetes poorly controlled with oral metformin, history of 

 1 desires repeat, family planning complete, morbid obesity


Postoperative Diagnosis: 


Same


Procedure(s) Performed: 


Repeat  section with bilateral salpingectomy


Anesthesia: spinal


Surgeon: Francesca Schmidt


Assistant #1: Mery House


Estimated Blood Loss (ml): 500


IV fluids (ml): 1,000


Urine output (ml): 100 (Clear yellow)


Pathology: other (Placenta bilateral fallopian tubes)


Condition: stable


Disposition: observation


Indications for Procedure: 


Patient request for repeat  section given her prior .  Patient 

states she is done with childbearing and desires permanent sterilization with 

bilateral salpingectomy.


Operative Findings: 


Normal uterus tubes and ovaries were appreciated.  Viable female infant 

delivered at 1233, weight of 8 lbs. 12 oz., Apgars of 9 and 9 at one and 5 

minutes respectively.


Description of Procedure: 


The patient was prepped and draped in the usual fashion after spinal anesthesia 

was administered by the anesthesia department.  A Pfannenstiel incision was made

and extended of the abdominal cavity without difficulty.  The bladder peritoneum

was elevated and incised and reflected distally.  An Jacob abdominal retractor 

was then placed into the abdomen, the vesicouterine peritoneum was identified 

and a bladder flap was created using sharp and blunt dissection.  The bladder 

blade was then inserted and the pelvis.  A 2 cm incision was made in the 

transverse plane of the lower uterine segment to enter the uterus at which time 

clear fluid was noted.  The incision was extended in both directions bluntly.  

Copious clear fluid was obtained, the fetal head was encountered within the 

field and delivered up and through the incision where the nose and mouth were 

thoroughly suctioned.  Remainder of the infant was delivered onto the surgical 

field where the cord was doubly clamped, cut, and the infant was passed for 

resuscitative measures with weight and Apgars as noted above.  The placenta was 

delivered manually, intact, and was grossly normal with a grossly normal three-

vessel cord.  The uterus was exteriorized and the interior cavity of the uterus 

swept of any remaining placental and membranous fragments with a laparotomy 

sponge.  The margins of the incision were grasped with Allis clamps, the uterine

incision was closed with 0 Vicryl in a running locked fashion..  Bleeding was 

noted on the midportion of the uterine incision therefore a figure-of-eight 

suture was used to obtain hemostasis.  The right fallopian tube was then 

elevated and the LigaSure was opened and mesosalpinx was cauterized and 

transected the fallopian tube was then passed off the operating field, this was 

then repeated on the opposite side.  Good hemostasis was appreciated after 

removal of both fallopian tubes.  Any small points of bleeding were then made 

hemostatic with the Bovie.  Once hemostasis was achieved, the posterior 

cul-de-sac was suctioned with a guard and the uterine and ovarian findings are 

as noted above.  The uterus was replaced within the abdominal cavity and the gu

tters swept of any remaining blood fluid or clot.  The incision was again 

reexamined and hemostasis was noted to be excellent.  Any small point of 

bleeding were made hemostatic with the Bovie.  Once hemostasis was achieved the 

parietal peritoneum was loosely reapproximated.  The layer of muscles were 

examined and made hemostatic with the Bovie.  Attention was then turned to the 

fascia which was closed with 2 running stitches of 0 Vicryl proceeding from the 

lateral margins to the midpoint.  The subcutaneous tissues were irrigated, made 

hemostatic with the Bovie, and reapproximated with a running stitch of 30 

Vicryl.  The skin was reapproximated with 4-0 Vicryl.  Estimated blood loss for 

the case was approximately 500 mL.  All sponge instrument and needle counts are 

correct.  There were no complications.  The patient tolerated the procedure well

and proceeded to the recovery room in stable condition.  Both mother and infant 

are resting comfortably in recovery.

## 2023-10-03 LAB
BASOPHILS # BLD AUTO: 0 K/UL (ref 0–0.2)
BASOPHILS NFR BLD AUTO: 0 %
EOSINOPHIL # BLD AUTO: 0 K/UL (ref 0–0.7)
EOSINOPHIL NFR BLD AUTO: 0 %
ERYTHROCYTE [DISTWIDTH] IN BLOOD BY AUTOMATED COUNT: 3.54 M/UL (ref 3.8–5.4)
ERYTHROCYTE [DISTWIDTH] IN BLOOD: 13.8 % (ref 11.5–15.5)
HCT VFR BLD AUTO: 31.3 % (ref 34–46)
HGB BLD-MCNC: 10.5 GM/DL (ref 11.4–16)
LYMPHOCYTES # SPEC AUTO: 1.4 K/UL (ref 1–4.8)
LYMPHOCYTES NFR SPEC AUTO: 18 %
MCH RBC QN AUTO: 29.7 PG (ref 25–35)
MCHC RBC AUTO-ENTMCNC: 33.5 G/DL (ref 31–37)
MCV RBC AUTO: 88.6 FL (ref 80–100)
MONOCYTES # BLD AUTO: 0.5 K/UL (ref 0–1)
MONOCYTES NFR BLD AUTO: 7 %
NEUTROPHILS # BLD AUTO: 5.7 K/UL (ref 1.3–7.7)
NEUTROPHILS NFR BLD AUTO: 74 %
PLATELET # BLD AUTO: 184 K/UL (ref 150–450)
WBC # BLD AUTO: 7.7 K/UL (ref 3.8–10.6)

## 2023-10-03 RX ADMIN — POTASSIUM CHLORIDE SCH MLS/HR: 14.9 INJECTION, SOLUTION INTRAVENOUS at 02:00

## 2023-10-03 RX ADMIN — POTASSIUM CHLORIDE SCH: 14.9 INJECTION, SOLUTION INTRAVENOUS at 06:25

## 2023-10-03 RX ADMIN — DIPHENHYDRAMINE HYDROCHLORIDE PRN MG: 50 INJECTION, SOLUTION INTRAMUSCULAR; INTRAVENOUS at 00:30

## 2023-10-03 RX ADMIN — DOCUSATE SODIUM AND SENNOSIDES SCH EACH: 50; 8.6 TABLET ORAL at 08:59

## 2023-10-03 RX ADMIN — DOCUSATE SODIUM AND SENNOSIDES SCH: 50; 8.6 TABLET ORAL at 23:23

## 2023-10-03 RX ADMIN — IBUPROFEN SCH MG: 600 TABLET ORAL at 12:08

## 2023-10-03 RX ADMIN — Medication SCH EACH: at 08:59

## 2023-10-03 RX ADMIN — IBUPROFEN SCH MG: 600 TABLET ORAL at 18:18

## 2023-10-03 RX ADMIN — IBUPROFEN SCH: 600 TABLET ORAL at 01:34

## 2023-10-03 RX ADMIN — IBUPROFEN PRN MLS/HR: 800 INJECTION INTRAVENOUS at 00:30

## 2023-10-03 RX ADMIN — IBUPROFEN SCH MG: 600 TABLET ORAL at 06:10

## 2023-10-03 NOTE — P.PNOBGPC
Subjective





- Subjective


Principal diagnosis: Postop day 1, repeat  with bilateral salpingectomy


Interval history: 





Patient is doing well.  She is ambulating and voiding without difficulty.  She 

states her pain is well-controlled.  She notes minimal lochia.  She is bottle 

feeding.


Patient reports: Reports appetite normal, Reports voiding normally, Reports pain

well controlled, Reports ambulating normally


[place]: doing well, bottle feeding





Objective





- Vital Signs


Latest vital signs: 


                                   Vital Signs











  Temp Pulse Resp BP Pulse Ox


 


 10/03/23 12:00  98.9 F  82  16  141/83  99


 


 10/03/23 08:00  98.3 F  86  18  119/75  96


 


 10/03/23 03:45  98.3 F  86  16  126/61  97


 


 10/03/23 00:00  98.5 F  75  16  119/71  97


 


 10/02/23 20:30  98.1 F  95  16  132/70  98


 


 10/02/23 15:15  98.1 F  62  17  110/59  97


 


 10/02/23 14:43   75  16  121/59 


 


 10/02/23 14:13   57 L  17  114/58  94 L


 


 10/02/23 13:54   70  16  107/54  94 L


 


 10/02/23 13:43   71  16  113/56  95


 


 10/02/23 13:28   80  15  117/59  95


 


 10/02/23 13:13  97.2 F L  70  16  124/61  98








                                Intake and Output











 10/02/23 10/03/23 10/03/23





 22:59 06:59 14:59


 


Output Total 


 


Balance -550 -500 -1250


 


Output:   


 


  Urine 


 


    Straight  500 


 


    Uretheral ([place]) 100  


 


  Output, Quantitative 150  





  Blood Loss   


 


Other:   


 


  # Voids   1


 


  # Bowel Movements   1














- Exam


Extremities: Present: normal, edema


Abdomen: Present: normal appearance, soft


Incision: Present: normal, dry, intact


Uterus: Present: normal, firm





- Labs


Labs: 


                  Abnormal Lab Results - Last 24 Hours (Table)











  10/03/23 Range/Units





  07:07 


 


RBC  3.54 L  (3.80-5.40)  m/uL


 


Hgb  10.5 L  (11.4-16.0)  gm/dL


 


Hct  31.3 L  (34.0-46.0)  %














Assessment and Plan


(1) Diabetes in pregnancy


Current Visit: Yes   Status: Acute   Code(s): O24.919 - UNSP DIABETES MELLITUS 

IN PREGNANCY, UNSPECIFIED TRIMESTER   SNOMED Code(s): 292714335


   





(2) Term pregnancy


Current Visit: No   Status: Acute   Code(s): Z34.90 -    SNOMED Code(s): 

89568420


   





(3) S/P  section


Current Visit: No   Status: Acute   Code(s): Z98.891 -    SNOMED Code(s): 

045296448


   


Plan: 





Patient is doing well postoperatively.  We will continue routine postoperative 

care, anticipate discharge home tomorrow.

## 2023-10-03 NOTE — P.PN
Progress Note - Text


Date: 10/03/2023 Time: 06:58


The patient is status post  section


Vital signs stable


VAS: 0-10


Patient has no complaints of pain.  The patient incurred some minimal itching 

yesterday, this itching is  now subsiding.


Pain meds to be managed by service.

## 2023-10-04 VITALS
SYSTOLIC BLOOD PRESSURE: 138 MMHG | HEART RATE: 88 BPM | DIASTOLIC BLOOD PRESSURE: 82 MMHG | TEMPERATURE: 98.6 F | RESPIRATION RATE: 14 BRPM

## 2023-10-04 RX ADMIN — DOCUSATE SODIUM AND SENNOSIDES SCH: 50; 8.6 TABLET ORAL at 09:24

## 2023-10-04 RX ADMIN — Medication SCH EACH: at 10:36

## 2023-10-04 RX ADMIN — IBUPROFEN SCH MG: 600 TABLET ORAL at 05:49

## 2023-10-04 RX ADMIN — IBUPROFEN SCH MG: 600 TABLET ORAL at 00:18
